# Patient Record
Sex: FEMALE | URBAN - NONMETROPOLITAN AREA
[De-identification: names, ages, dates, MRNs, and addresses within clinical notes are randomized per-mention and may not be internally consistent; named-entity substitution may affect disease eponyms.]

---

## 2022-10-19 ENCOUNTER — APPOINTMENT (OUTPATIENT)
Dept: URBAN - NONMETROPOLITAN AREA CLINIC 51 | Age: 82
Setting detail: DERMATOLOGY
End: 2022-10-23

## 2022-10-19 DIAGNOSIS — L72.8 OTHER FOLLICULAR CYSTS OF THE SKIN AND SUBCUTANEOUS TISSUE: ICD-10-CM

## 2022-10-19 DIAGNOSIS — L57.8 OTHER SKIN CHANGES DUE TO CHRONIC EXPOSURE TO NONIONIZING RADIATION: ICD-10-CM

## 2022-10-19 DIAGNOSIS — L85.3 XEROSIS CUTIS: ICD-10-CM

## 2022-10-19 PROCEDURE — 99203 OFFICE O/P NEW LOW 30 MIN: CPT

## 2022-10-19 PROCEDURE — OTHER MIPS QUALITY: OTHER

## 2022-10-19 PROCEDURE — OTHER COUNSELING: OTHER

## 2022-10-19 PROCEDURE — OTHER ADDITIONAL NOTES: OTHER

## 2022-10-19 ASSESSMENT — LOCATION SIMPLE DESCRIPTION DERM: LOCATION SIMPLE: POSTERIOR NECK

## 2022-10-19 ASSESSMENT — LOCATION ZONE DERM: LOCATION ZONE: NECK

## 2022-10-19 ASSESSMENT — LOCATION DETAILED DESCRIPTION DERM: LOCATION DETAILED: MID POSTERIOR NECK

## 2022-10-19 NOTE — PROCEDURE: ADDITIONAL NOTES
Additional Notes: It has been spontaneously rupturing and draining. It’s bothersome and uncomfortable for her.Referral to general surgeon close to the [place] area. Additional Notes: It has been spontaneously rupturing and draining. It’s bothersome and uncomfortable for her.Referral to general surgeon close to the [place] area.

## 2022-10-19 NOTE — PROCEDURE: MIPS QUALITY
Quality 402: Tobacco Use And Help With Quitting Among Adolescents: Patient screened for tobacco and never smoked
Quality 111:Pneumonia Vaccination Status For Older Adults: Documentation of medical reason(s) for not administering pneumococcal vaccine (e.g., adverse reaction to vaccine)
Detail Level: Detailed
Quality 431: Preventive Care And Screening: Unhealthy Alcohol Use - Screening: Patient not identified as an unhealthy alcohol user when screened for unhealthy alcohol use using a systematic screening method
Quality 110: Preventive Care And Screening: Influenza Immunization: Influenza Immunization Administered during Influenza season